# Patient Record
Sex: MALE | Race: BLACK OR AFRICAN AMERICAN
[De-identification: names, ages, dates, MRNs, and addresses within clinical notes are randomized per-mention and may not be internally consistent; named-entity substitution may affect disease eponyms.]

---

## 2020-03-26 ENCOUNTER — HOSPITAL ENCOUNTER (EMERGENCY)
Dept: HOSPITAL 25 - UCEAST | Age: 65
Discharge: HOME | End: 2020-03-26
Payer: MEDICARE

## 2020-03-26 VITALS — SYSTOLIC BLOOD PRESSURE: 137 MMHG | DIASTOLIC BLOOD PRESSURE: 80 MMHG
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
## 2020-03-26 DIAGNOSIS — Z79.84: ICD-10-CM
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
## 2020-03-26 DIAGNOSIS — Z23: ICD-10-CM

## 2020-03-26 DIAGNOSIS — I10: ICD-10-CM

## 2020-03-26 DIAGNOSIS — Z87.891: ICD-10-CM

## 2020-03-26 DIAGNOSIS — Z79.899: ICD-10-CM
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
## 2020-03-26 DIAGNOSIS — S91.331A: Primary | ICD-10-CM

## 2020-03-26 DIAGNOSIS — E11.9: ICD-10-CM

## 2020-03-26 PROCEDURE — 90715 TDAP VACCINE 7 YRS/> IM: CPT

## 2020-03-26 PROCEDURE — G0463 HOSPITAL OUTPT CLINIC VISIT: HCPCS

## 2020-03-26 PROCEDURE — 99212 OFFICE O/P EST SF 10 MIN: CPT

## 2020-03-26 NOTE — UC
Skin Complaint HPI





- HPI Summary


HPI Summary: 





63 yo male presents with puncture wound. He tells me that today he was wearing 

his sneakers and was outside doing lawn work. He came inside and took his shoes 

off and realized he stepped on a nail that punctured his right foot. He did not 

feel this due to his diabetic neuropathy. He removed the nail in one piece. 

Came to  for eval. Unsure date of last tetanus shot. 





- History of Current Complaint


Time Seen by Provider: 03/26/20 15:19


Stated Complaint: PUNCTURE WOUND TO FOOT


Hx Obtained From: Patient


Current Severity: None





- Allergy/Home Medications


Allergies/Adverse Reactions: 


 Allergies











Allergy/AdvReac Type Severity Reaction Status Date / Time


 


No Known Allergies Allergy   Verified 03/26/20 15:19











Home Medications: 


 Home Medications





Insulin Glargine,Hum.rec.anlog [Lantus] 1 dose SUBCUT DAILY 09/18/15 [History 

Confirmed 03/26/20]


Lisinopril [Zestril 10 MG-] 10 mg PO DAILY 09/18/15 [History Confirmed 03/26/20]


Metformin HCl [Fortamet] 1,000 mg PO DAILY 09/18/15 [History Confirmed 03/26/20]


Cholecalciferol TAB* [Vitamin D TAB*] 2 tab PO DAILY 11/04/16 [History 

Confirmed 03/26/20]


Aspirin [Eql Aspirin] 325 mg PO DAILY 02/06/17 [History Confirmed 03/26/20]


Canagliflozin (NF) [Invokana (NF)] 150 mg PO DAILY 02/06/17 [History Confirmed 

02/06/17]


Pregabalin 50 mg CAP (*) [Lyrica CAP(*)] 50 mg PO TID 02/06/17 [History 

Confirmed 03/26/20]


Rosuvastatin (NF) [Crestor] 10 mg PO DAILY 02/06/17 [History Confirmed 03/26/20]


Levofloxacin TAB* [Levaquin TAB*] 750 mg PO DAILY #5 tab 03/26/20 [Rx]











PMH/Surg Hx/FS Hx/Imm Hx


Endocrine History: Diabetes, Dyslipidemia


Cardiovascular History: Hypertension





- Surgical History


Surgical History: None





- Family History


Known Family History: Positive: Hypertension


   Negative: Diabetes





- Social History


Lives: With Family


Alcohol Use: None


Substance Use Type: None


Smoking Status (MU): Former Smoker





- Immunization History


Most Recent Influenza Vaccination: 2016





Review of Systems


All Other Systems Reviewed And Are Negative: No


Constitutional: Positive: Negative


Skin: Positive: Other - Puncture wound


Respiratory: Positive: Negative


Cardiovascular: Positive: Negative


Neurological/Mental Status: Positive: Negative


Psychological: Positive: Negative





Physical Exam





- Summary


Physical Exam Summary: 





GENERAL: NAD. WDWN. No pain distress.


SKIN: RIGHT FOOT: Plantar aspect just inferior to 4th toe - superficial abrasion

/skin tear. Along plantar 4th MTP there is a 7mm partial thickness laceration 

1mm depth <1mm width. NTTP. No drainage. Clean appearing.


CHEST:  No accessory muscle use. Breathing comfortably and in no distress.


CV:  Pulses intact. Cap refill <2seconds


MSK: FROM all toes right foot. 


NEURO: Alert.


PSYCH: Age appropriate behavior.





Triage Information Reviewed: Yes


Vital Signs: 





Vital Signs:











Temp Pulse Resp BP Pulse Ox


 


 99.2 F   118   16   137/80   100 


 


 03/26/20 15:47  03/26/20 15:22  03/26/20 15:22  03/26/20 15:22  03/26/20 15:22











Vital Signs Reviewed: Yes





Course/Dx





- Course


Course Of Treatment: 





Right foot puncture wound.


Area cleansed with saline and telfa dressing applied.


tdap updated today.


Given puncture wound through sole of shoe and diabetes - will place him on 

pseudo coverage with levaquin for 5 days.


Advised to change dressing daily and monitor area and be rechecked for any 

changes or worsening/new symptoms. 





- Diagnoses


Provider Diagnosis: 


 Puncture wound of right foot








Discharge ED





- Sign-Out/Discharge


Documenting (check all that apply): Patient Departure


All imaging exams completed and their final reports reviewed: No Studies





- Discharge Plan


Condition: Stable


Disposition: HOME


Prescriptions: 


Levofloxacin TAB* [Levaquin TAB*] 750 mg PO DAILY #5 tab


Patient Education Materials:  Puncture Wound (ED)


Referrals: 


Gio Meadows MD [Primary Care Provider] - 


Additional Instructions: 


If you develop a fever, shortness of breath, chest pain, new or worsening 

symptoms - please call your PCP or go to the ED immediately.


 


Change the dressing daily until well healed.





If your notice pain, redness, drainage, swelling, or fever - please be 

rechecked immediately. 





- Billing Disposition and Condition


Condition: STABLE


Disposition: Home

## 2020-03-26 NOTE — XMS REPORT
Summary of Care

 Created on:2020



Patient:Álvaro Girard

Sex:Male

:1955

External Reference #:[id number]





Demographics







 Address  [address] [place] [address]

 

 Home Phone  [phone]

 

 Mobile Phone  [phone]

 

 Preferred Language  English

 

 Marital Status  Not  or 

 

 Judaism Affiliation  Unknown

 

 Race  White

 

 Ethnic Group  Not  or 









Author







 Organization  [facility]

 

 Address  [address] [place] [address]









Support







 Name  Relationship  Address  Phone

 

 Contact No  Unavailable  Unavailable  Unavailable









Care Team Providers







 Name  Role  Phone

 

 Gio Meadows MD  Primary Care Provider  [phone]









Reason for Visit







 Reason  Comments

 

 Follow-up  







Encounter Details







 Date  Type  Department  Care Team  Description

 

 2020  Office Visit  [facility]  Michelle Juarez PVD (peripheral



     Associates MD [facility]



  vascular disease)



     Department of Surgery,  [address] [place] [address]



  (Primary Dx)



     Division of Vascular  Room [facility]



  



     [place], [place]  



     Endovascular Services



  [id number]



  



     [address] [facility]



  [phone]



  



     [address]



  [phone]  



     [place] [address]



  (Fax)  



     [phone]    







Allergies

No Known Allergiesdocumented as of this encounter (statuses as of 2020)



Medications







 Medication  Sig  Dispensed  Refills  Start Date  End Date  Status

 

 insulin glargine  Inject  into the    0      Active



 (LANTUS SOLOSTAR) 100  skin nightly.          



 UNIT/ML SOPN pen            

 

 aspirin 81 MG tablet  Take 81 mg by    0      Active



   mouth daily.          

 

 tadalafil (CIALIS) 20  Take 20 mg by    0      Active



 MG tablet  mouth as needed          



   for Erectile          



   Dysfunction.          

 

 lisinopril  Take 10 mg by    0      Active



 (PRINIVIL,ZESTRIL) 10  mouth daily.          



 MG tablet            

 

 Cholecalciferol  Take 2 tablets by    0      Active



 (VITAMIN D3) 1000  mouth daily.          



 UNITS CAPS            

 

 pregabalin (LYRICA)  Take 50 mg by    0      Active



 50 MG capsule  mouth Three times          



   daily          

 

 lovastatin (MEVACOR)  Take 20 mg by    0      Active



 20 MG tablet  mouth nightly          

 

 sitagliptin (JANUVIA)  Take 1 tablet by  90 tablet  3  2017    Active



 50 MG tablet  mouth daily          

 

 metformin  TAKE ONE TABLET  180 tablet  0  2018    Active



 (GLUCOPHAGE) 1000 MG  BY MOUTH TWICE A          



 tabletIndications:  DAY WITH MEALS          



 Type 2 diabetes            



 mellitus with            



 hypoglycemia and            



 coma, with long-term            



 current use of            



 insulin            

 

 Muse 250 MCG Urethral  INSERT ONE    0  2019    Active



 Pellet  SUPPOSITORY INTO          



   THE URETHRA TWICE          



   A DAY AS NEEDED          



   FOR ERECTILE          



   DYSFUNCTION          

 

 amLODIPine Besylate      0  2020    Active



 10 MG Oral Tablet            



 (NORVASC)            

 

 Furosemide 20 MG Oral      0  2019    Active



 Tablet (LASIX)            

 

 Nitroglycerin 0.4 MG      0  2019    Active



 Sublingual Tablet            



 Sublingual            



 (NITROSTAT)            

 

 Rosuvastatin Calcium      0  2020    Active



 10 MG Oral Tablet            



 (CRESTOR)            

 

 Cilostazol 100 MG  Take 1 tablet by  60 tablet  11  2020  
Active



 Oral Tablet  mouth Two Times          



   Daily          



documented as of this encounter (statuses as of 2020)



Active Problems







 Problem  Noted Date

 

 Uncontrolled type 2 diabetes mellitus with hyperglycemia, with long-term  



 current use of insulin  

 

 Essential hypertension  2017

 

 Mixed hyperlipidemia  2017

 

 Vitamin D deficiency  2017



documented as of this encounter (statuses as of 2020)



Social History







 Tobacco Use  Types  Packs/Day  Years Used  Date

 

 Former Smoker        









 Comments: quit in 









 Sex Assigned at Birth  Date Recorded

 

 Not on file  









 Job Start Date  Occupation  Industry

 

 Not on file  Not on file  Not on file









 Travel History  Travel Start  Travel End









 No recent travel history available.



documented as of this encounter



Last Filed Vital Signs







 Vital Sign  Reading  Time Taken  Comments

 

 Blood Pressure  151/86  2020  9:06 AM EST  

 

 Pulse  92  2020  9:06 AM EST  

 

 Temperature  36.8 

  2020  9:06 AM EST  



   C (98.3 

    



   F)    

 

 Respiratory Rate  20  2020  9:06 AM EST  

 

 Oxygen Saturation  98%  2020  9:06 AM EST  

 

 Inhaled Oxygen Concentration  -  -  

 

 Weight  111.6 kg (246 lb)  2020  9:06 AM EST  

 

 Height  182.9 cm (6')  2020  9:06 AM EST  

 

 Body Mass Index  33.36  2020  9:06 AM EST  



documented in this encounter



Progress Notes

Michelle Juarez MD - 2020  9:00 AM ESTFormatting of this note might be 
different from the original.

Subjective:



Patient ID: Álvaro Girard is a 64 y.o. male with past medical history of 
Diabetes mellitus, ED (erectile dysfunction), HLD (hyperlipidemia), and 
Hypertension who is her for follow up today.



18 Last note from Dr. Bay: " following for some lower extremity 
discomfort.  He has evidence of peripheral neuropathy.  He is currently on 
Lyrica.  There was some question of peripheral vascular disease and was 
initially evaluated with ABIs and PVRs.  He is here today for followup.  At the 
previous office visit, he had evidence of ABIs on the right to be approximately 
1.1 and on the left to be 0.96.  He would state that there were periods where 
his legs would cramp or feel heavy, althoughhe attributed it to possible 
dehydration. "



He had new ALPHONSE and is here to discuss those results.



Since he was last seen, his sxs have been stable.  No new complaints.  His 
glucoses have been up anddown.  Last time he checked it was 167.  He has had 
some trouble getting his needles and alcohol andmeter.

Former smoker quitting 18 years ago.

He can't walk far.  He was having some problems walking 20 yards and Dr. Harris 
saw him and

His note from Dr. Meadows (19) was reviewed.  He is referring him to Dr. Spence for poorly controlled DM.  He had some retinal issues and saw 
Ophthalmology.  He continues to have peripheral neuropathy and in on Lyrica.

He has BPH so is seeing Urology as well.



He had a stress test because of chest pain with Dr. Harris.  He said that he 
had increased his statin dose.



His ALPHONSE decreased so he is here today.  He feels that he can walk but has to 
stop after a bit.  He can walk 100 yrds.  It is not lifestyle limiting but 
worse when carrying things that are heavy.  Otherdays he can walk as far as he 
needs to. Both legs bother him.  The balls of his feet are tight.  Massaging 
them helps.  His feet swell also.  He is taking Furosamide and that improved it.



Chief Complaint:

KADIE Rea  has a past medical history of Diabetes mellitus, ED (erectile 
dysfunction), HLD (hyperlipidemia), and Hypertension.

Álvaro has Uncontrolled type 2 diabetes mellitus with hyperglycemia, with long-
term current use of insulin; Essential hypertension; Mixed hyperlipidemia; and 
Vitamin D deficiency on their problem list.

Álvaro  has no past surgical history on file.

His family history is not on file.

Álvaro  reports that he has quit smoking. He does not have any smokeless 
tobacco history on file. Nohistory on file for alcohol and drug.

Álvaro has a current medication list which includes the following prescription(s
): amlodipine, aspirin, vitamin d3, furosemide, insulin glargine, lisinopril, 
lovastatin, metformin, muse, nitroglycerin,pregabalin, rosuvastatin, tadalafil, 
and sitagliptin.

Current Outpatient Medications on File Prior to Visit

Medication Sig Dispense Refill

 amLODIPine Besylate 10 MG Oral Tablet (NORVASC)

 aspirin 81 MG tablet Take 81 mg by mouth daily.

 Cholecalciferol (VITAMIN D3) 1000 UNITS CAPS Take 2 tablets by mouth 
daily.

 Furosemide 20 MG Oral Tablet (LASIX)

 insulin glargine (LANTUS SOLOSTAR) 100 UNIT/ML SOPN pen Inject  into the 
skin nightly.

 lisinopril (PRINIVIL,ZESTRIL) 10 MG tablet Take 10 mg by mouth daily.

 lovastatin (MEVACOR) 20 MG tablet Take 20 mg by mouth nightly

 metformin (GLUCOPHAGE) 1000 MG tablet TAKE ONE TABLET BY MOUTH TWICE A 
DAY WITH MEALS 180 tablet 0

 [place] 250 MCG Urethral Pellet INSERT ONE SUPPOSITORY INTO THE URETHRA 
TWICE A DAY AS NEEDED FOR ERECTILE DYSFUNCTION

 Nitroglycerin 0.4 MG Sublingual Tablet Sublingual (NITROSTAT)

 pregabalin (LYRICA) 50 MG capsule Take 50 mg by mouth Three times daily

 Rosuvastatin Calcium 10 MG Oral Tablet (CRESTOR)

 tadalafil (CIALIS) 20 MG tablet Take 20 mg by mouth as needed for 
Erectile Dysfunction.

 sitagliptin (JANUVIA) 50 MG tablet Take 1 tablet by mouth daily 90 
tablet 3



No current facility-administered medications on file prior to visit.



Álvaro has No Known Allergies.

Review of Systems

All other systems reviewed and are negative.



Objective:

Physical Exam

Constitutional: He is oriented to person, place, and time. He appears well-
developed and well-nourished.

HENT:

Head: Normocephalic.

Eyes: Pupils are equal, round, and reactive to light.

Neck: Normal range of motion.

Cardiovascular: Normal rate.

Femoral 2+

Right popliteal 2+; popliteal not palpable.

Left peroneal monophasic, AT, PT biphasic

Right peroneal biphasic; monophasic DP, PT

Pulmonary/Chest: Effort normal.

Abdominal: Soft.

Musculoskeletal: Normal range of motion.

Neurological: He is alert and oriented to person, place, and time.

Skin: Skin is warm and dry.

Psychiatric: He has a normal mood and affect. His behavior is normal. Judgment 
and thought content normal.

Nursing note and vitals reviewed.



Lab Review:

1/22/10  ALPHONSE:

Right PT 0.89; DP 0.77

Left PT 0.56; DP 0.62



Reduced since 2018: Right 0.98 and Left 0.9



Assessment:



1. PVD (peripheral vascular disease)



Plan:



He needs optimization of his cardiovascular risk factors.

He is somewhat bothered by the limitation of his walking and would like to try 
Pletal.

He has been sleeping in a recliner and this is not good.  I asked him to sleep 
in his bad.  That is contributing to his LE edema.

His PAD has progressed.  He has neuropathy and should see a Podiatrist 
regularly to monitor for deformity.

I will put in a prescirption for Pletal 100 mg Po BID.  If he gets HA or 
diarrhea he can cut the pill in half.

I'll repeat his ALPHONSE with TBI in 6 months and I'll see him back then.



Electronically signed by Michelle Juarez MD at 2020  4:43 PM ESTdocumented 
in this encounter



Plan of Treatment







 Health Maintenance  Due Date  Last Done  Comments

 

 Hepatitis C Screening (B.  [birth year])      

 

 MMR Vaccines (1 of 1 -  1956    



 Standard series)      

 

 Varicella Vaccines (1 of 2 -  1955    



 2-dose childhood series)      

 

 DTaP,Tdap,and Td Vaccines (1  1961    



 - Tdap)      

 

 Diabetic Foot Exam  1972    

 

 Dilated Retinal Exam  1972    

 

 Hepatitis B Vaccines (1 of 3  1973    



 - Risk 3-dose series)      

 

 Colon Cancer Screening 10 yrs  2004    

 

 Zoster Vaccines (1 of 2)  2004    

 

 Hemoglobin A1c  10/07/2017  2017,  



     2017  

 

 Lipid Disorder Screening  2018  

 

 Urine Microalbumin  2018  

 

 Influenza Vaccine  10/01/2019    

 

 Pneumococcal Vaccine: 65+  2019    



 Years (1 of 2 - PCV13)      

 

 HIB Vaccines  Aged Out    No longer eligible based



       on patient's age to



       complete this topic

 

 Hepatitis A Vaccines  Aged Out    No longer eligible based



       on patient's age to



       complete this topic

 

 IPV Vaccines  Aged Out    No longer eligible based



       on patient's age to



       complete this topic

 

 Pneumococcal Vaccine:  Aged Out    No longer eligible based



 Pediatrics (0 to 5 Years) and      on patient's age to



 At-Risk Patients (6 to 64      complete this topic



 Years)      



documented as of this encounter



Results

Not on filedocumented in this encounter



Visit Diagnoses







 Diagnosis

 

 PVD (peripheral vascular disease) - Primary







 Peripheral vascular disease, unspecified



documented in this encounter

## 2020-03-28 NOTE — UC
- Progress Note


Progress Note: 


pharmacist called to discuss patients rx from Dr. Márquez for Cipro after he had 

an untoward reaction with Levoflox.   will rx augment 875 mg po bid for 10 days-

--I called patient with specific instructions to observe foot several times a 

day and if symptoms worsen or fail in improve to go to the ED for care as he 

may need IV antibiotics---   Patient verbalizes understanding and will follow 

up as planned--








Course/Dx





- Diagnoses


Provider Diagnoses: 


 Puncture wound of right foot








Discharge ED





- Sign-Out/Discharge


Documenting (check all that apply): Post-Discharge Follow Up


All imaging exams completed and their final reports reviewed: No Studies





- Discharge Plan


Condition: Stable


Disposition: HOME


Prescriptions: 


Levofloxacin TAB* [Levaquin TAB*] 750 mg PO DAILY #5 tab


Patient Education Materials:  Puncture Wound (ED)


Referrals: 


Gio Meadows MD [Primary Care Provider] - 


Additional Instructions: 


If you develop a fever, shortness of breath, chest pain, new or worsening 

symptoms - please call your PCP or go to the ED immediately.


 


Change the dressing daily until well healed.





If your notice pain, redness, drainage, swelling, or fever - please be 

rechecked immediately. 





- Billing Disposition and Condition


Condition: STABLE


Disposition: Home